# Patient Record
Sex: FEMALE | Race: ASIAN | NOT HISPANIC OR LATINO | URBAN - METROPOLITAN AREA
[De-identification: names, ages, dates, MRNs, and addresses within clinical notes are randomized per-mention and may not be internally consistent; named-entity substitution may affect disease eponyms.]

---

## 2020-02-22 ENCOUNTER — EMERGENCY (EMERGENCY)
Facility: HOSPITAL | Age: 30
LOS: 1 days | Discharge: HOME | End: 2020-02-22
Admitting: EMERGENCY MEDICINE
Payer: COMMERCIAL

## 2020-02-22 VITALS
HEART RATE: 88 BPM | SYSTOLIC BLOOD PRESSURE: 118 MMHG | DIASTOLIC BLOOD PRESSURE: 73 MMHG | TEMPERATURE: 97 F | RESPIRATION RATE: 16 BRPM | OXYGEN SATURATION: 100 %

## 2020-02-22 PROCEDURE — 99283 EMERGENCY DEPT VISIT LOW MDM: CPT

## 2020-02-22 PROCEDURE — 99053 MED SERV 10PM-8AM 24 HR FAC: CPT

## 2020-02-22 NOTE — ED ADULT TRIAGE NOTE - CHIEF COMPLAINT QUOTE
right eye irritation    pt slept in her contacts on Thursday night... took them out on Friday night.... right eye burning, tearing, blurry vision

## 2020-02-23 RX ORDER — ACETAMINOPHEN 500 MG
650 TABLET ORAL ONCE
Refills: 0 | Status: COMPLETED | OUTPATIENT
Start: 2020-02-23 | End: 2020-02-23

## 2020-02-23 RX ORDER — TOBRAMYCIN 0.3 %
1 DROPS OPHTHALMIC (EYE) ONCE
Refills: 0 | Status: COMPLETED | OUTPATIENT
Start: 2020-02-23 | End: 2020-02-23

## 2020-02-23 RX ADMIN — Medication 650 MILLIGRAM(S): at 01:24

## 2020-02-23 RX ADMIN — Medication 1 DROP(S): at 02:11

## 2020-02-23 NOTE — ED PROVIDER NOTE - NSFOLLOWUPINSTRUCTIONS_ED_ALL_ED_FT
Follow up with your PMD within 48-72 hours.      Follow up with our optho clinic at [phone] or our private optho group [phone] within 1-2 days.      Tobramycin eye drops- place 2 drops right eye 3x/day for 1 week.      Cold compresses to eye for pain.      Take Motrin 600mg every 8hrs with food for pain.    Take Tylenol 650mg every 4-6 hours as needed for pain     Worsening pain, new fever, chills, vision changes return to ER.

## 2020-02-23 NOTE — ED PROVIDER NOTE - PHYSICAL EXAMINATION
eye: right eye injected conjunctiva, EOMI, PERRL, no orbital tenderness throughout, fluorescein exam with 2 corneal abrasions one linear around 12 o'clock around iris larger corneal abrasion to the bottom patch of eye 6 o'clock to 8 o'clock extending down from the bottom half iris to lower eye eye: right eye injected conjunctiva, EOMI, PERRL, no orbital tenderness throughout, fluorescein exam with 2 corneal abrasions one linear around 12 o'clock around iris larger corneal abrasion to the bottom patch of eye 6 o'clock to 8 o'clock extending down from the bottom half iris to lower eye.

## 2020-02-23 NOTE — ED ADULT NURSE NOTE - OBJECTIVE STATEMENT
Received Pt in intake 10C. Pt A&OX3, ambulatory, family at bedside. Pt c/o right eye irritation, states she slept with her contacts in on Thursday night, and took them out on Friday. Pt now reports right eye burning, tearing blurry vision. Pt appears stable and in no apparent distress. Denies any past medical history. Respirations are equal and nonlabored, no respiratory distress noted. Denies any other medical complaints at this time. pt stable, medicated as per orders. awaiting further plan

## 2020-02-23 NOTE — ED ADULT NURSE NOTE - NSIMPLEMENTINTERV_GEN_ALL_ED
Implemented All Universal Safety Interventions:  [place] to call system. Call bell, personal items and telephone within reach. Instruct patient to call for assistance. Room bathroom lighting operational. Non-slip footwear when patient is off stretcher. Physically safe environment: no spills, clutter or unnecessary equipment. Stretcher in lowest position, wheels locked, appropriate side rails in place.

## 2020-02-23 NOTE — ED PROVIDER NOTE - CLINICAL SUMMARY MEDICAL DECISION MAKING FREE TEXT BOX
28 y/o F with corneal abrasion secondary to contact use and wearing contact overnight. Will prescribe antibiotic drops azithromycin ophthalmic and f/u with ophthalmology this week.

## 2020-02-23 NOTE — ED PROVIDER NOTE - OBJECTIVE STATEMENT
28 y/o F with no significant PMHx presents to the ED c/o right eye redness, pain, photophobia, tearing and mild headache after wearing her contacts to sleep last night. Pt reports she took them out this morning and noted the discomfort right away gradually worsening throughout the day causing her to come to the ED for treatment. Reports blurry vision in the right eye but no visual loss. Denies head trauma, blunt trauma to eye, fever, chills, vomiting. No other complaints reported. Nonsmoker, nondrinker, no drug use. Allergies: Amoxicillin (breaks out into a rash)

## 2020-02-23 NOTE — ED PROVIDER NOTE - PATIENT PORTAL LINK FT
You can access the FollowMyHealth Patient Portal offered by [facility] by registering at the following website: [email or web address]. By joining [facility] FollowMyHealth portal, you will also be able to view your health information using other applications (apps) compatible with our system.

## 2025-05-28 ENCOUNTER — EMERGENCY (EMERGENCY)
Facility: HOSPITAL | Age: 35
LOS: 1 days | End: 2025-05-28
Admitting: EMERGENCY MEDICINE
Payer: OTHER MISCELLANEOUS

## 2025-05-28 VITALS
RESPIRATION RATE: 16 BRPM | HEART RATE: 60 BPM | OXYGEN SATURATION: 100 % | SYSTOLIC BLOOD PRESSURE: 111 MMHG | TEMPERATURE: 98 F | DIASTOLIC BLOOD PRESSURE: 77 MMHG

## 2025-05-28 VITALS
TEMPERATURE: 98 F | SYSTOLIC BLOOD PRESSURE: 152 MMHG | RESPIRATION RATE: 20 BRPM | WEIGHT: 141.98 LBS | HEART RATE: 93 BPM | OXYGEN SATURATION: 98 % | DIASTOLIC BLOOD PRESSURE: 91 MMHG

## 2025-05-28 PROCEDURE — 72125 CT NECK SPINE W/O DYE: CPT | Mod: 26

## 2025-05-28 PROCEDURE — 99284 EMERGENCY DEPT VISIT MOD MDM: CPT

## 2025-05-28 PROCEDURE — 93010 ELECTROCARDIOGRAM REPORT: CPT

## 2025-05-28 PROCEDURE — 70450 CT HEAD/BRAIN W/O DYE: CPT | Mod: 26

## 2025-05-28 RX ORDER — METOCLOPRAMIDE HCL 10 MG
10 TABLET ORAL ONCE
Refills: 0 | Status: COMPLETED | OUTPATIENT
Start: 2025-05-28 | End: 2025-05-28

## 2025-05-28 RX ORDER — ACETAMINOPHEN 500 MG/5ML
1000 LIQUID (ML) ORAL ONCE
Refills: 0 | Status: COMPLETED | OUTPATIENT
Start: 2025-05-28 | End: 2025-05-28

## 2025-05-28 RX ADMIN — Medication 400 MILLIGRAM(S): at 18:36

## 2025-05-28 RX ADMIN — Medication 10 MILLIGRAM(S): at 18:36

## 2025-05-28 RX ADMIN — Medication 1000 MILLILITER(S): at 18:36
